# Patient Record
Sex: FEMALE | Race: WHITE | Employment: FULL TIME | URBAN - METROPOLITAN AREA
[De-identification: names, ages, dates, MRNs, and addresses within clinical notes are randomized per-mention and may not be internally consistent; named-entity substitution may affect disease eponyms.]

---

## 2017-08-16 NOTE — TELEPHONE ENCOUNTER
Macie Siegel to notify patient that her daisy on 09/05/2017 has been cancelled. Advised patient to call back to reschedule for 09/07/2017. Letter sent in mail.

## 2017-08-29 NOTE — TELEPHONE ENCOUNTER
Requesting Citalopram  LOV: 8/9/16  RTC: not noted  Last Labs: n/a  Filled: 8/9/16 #90 with 3 refills    Future Appointments  Date Time Provider Yariel Metz   9/19/2017 9:45 AM Sweta Tan MD [facility] [address] [facility] [address]    Patient is scheduled for office vis

## 2017-09-19 NOTE — PROGRESS NOTES
REASON FOR VISIT:    Jesse Hollingsworth is a 43year old female who presents for an [address].     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     Stress: manageable stress     Trying to do exercise    Seeing a therapist       If you are a male age 38-65 or a female for: A1C  Glucose (mg/dL)   Date Value   02/02/2015 81     GLUCOSE (mg/dL)   Date Value   01/30/2014 80         Gonorrhea Screening if high risk No results found for: GONOCOCCUS    HIV Screening For all adults age 22-65, older adults at increased risk HIV Prescriptions:  CITALOPRAM HYDROBROMIDE 20 MG Oral Tab TAKE 1 TABLET (20 MG) BY ORAL ROUTE ONCE DAILY Disp: 30 tablet Rfl: 0      MEDICAL INFORMATION:   Past Medical History:   Diagnosis Date   • Anxiety    • Celiac disease    • Chest pain, unspecified 1/2 history  ALL/ASTHMA: denies hx of allergy or asthma    EXAM:   BP 90/60   Pulse 60   Resp 16   Ht 63\"   Wt 138 lb   LMP 09/15/2017   BMI 24.45 kg/m²    Patient's last menstrual period was 09/15/2017.    GENERAL: well developed, well nourished, in no appare perfomed  Exercise counseling perfomed    SUGGESTED VACCINATIONS - Influenza, Pneumococcal, Zoster, Tetanus     Immunization History   Administered Date(s) Administered   • TD 03/20/2008       Influenza Annually   Pneumococcal if high risk   Td/Tdap once t

## 2017-10-03 NOTE — TELEPHONE ENCOUNTER
Requesting Citalopram   LOV: 9/19/17  RTC: 6 months   Last Labs: n./a   Filled: 8/30/17 #30 with 0 refills    No future appointments. Pended if okay.     Time started: 1448    Time ended: 6416    Total time spent on chart: 1 min

## 2017-10-03 NOTE — TELEPHONE ENCOUNTER
Pt calling to check on status of refill, sts it was to be authorized at her last visit on 9/19/17. Pt sts she is out of meds and needs for tonight. Pt wants to talk with nurse.

## 2017-11-23 NOTE — ED PROVIDER NOTES
Patient Seen in: THE [facility] Emergency Department In [place]    History   Patient presents with:  Fever (infectious)  Abdomen/Flank Pain (GI/)    Stated Complaint: fever/abd pain    HPI    This is a 61-year-old female who arrives here with complaints of Pulse 96   Temp 98.8 °F (37.1 °C) (Temporal)   Resp 14   Ht 160 cm (5' 3\")   Wt 63.5 kg   LMP 11/11/2017   SpO2 98%   BMI 24.80 kg/m²         Physical Exam  General: . Female no respiratory distress  The patient is in no respiratory distress.  The patient Abnormality         Status                     ---------                               -----------         ------                     CBC W/ DIFFERENTIAL[[id number]]          Abnormal            Final result                 Please view results for these Approved by: Greyson Reilly MD          The patient's urinalysis, lipase is comprehensive, pregnancy was negative.   The patient was reexamined she has some minimal tenderness left lower quadrant but no other right lower quadrant tenderness no rebound, guar

## 2018-10-04 PROBLEM — F41.9 ANXIETY: Status: ACTIVE | Noted: 2018-10-04

## 2018-10-04 NOTE — PATIENT INSTRUCTIONS
Your weight:  Wt Readings from Last 6 Encounters:  10/04/18 : 149 lb  10/02/18 : 147 lb 8 oz  11/23/17 : 140 lb  09/19/17 : 138 lb  05/09/17 : 130 lb 6.4 oz  08/09/16 : 135 lb 9.6 oz    Your BMI  Body mass index is 26.39 kg/m².     What does it mean to be o need to follow a diet that fits with their lifestyle, you have to eat healthy foods. No more fast foods and restaurants and takeout. No more processed foods from a box or frozen foods.   You need to eat mostly plants and vegetables, those should be the bu - baguettes   - croissants   - potato chips   - cookies   - white crackers   - brownies   - cakes   - candy   - sugar   - brown sugar   - honey     GOOD carbs  below  - spinach   - kale   - tomatoes   - mushrooms   - beets   - brussels sprouts   - brocco

## 2018-10-04 NOTE — PROGRESS NOTES
Patient presents with:  Physical: Routine physical no pap. pt had pap 10/2/18. Fasting labs due. Mammogram ordered by OB. Imm/Inj: flu shot  Medication Request: citalopram  Establish Care: re-establishing care with new provider      HPI:  1.  Hx of anxiet disorder     Celiac disease     Anxiety      Past Medical History:   Diagnosis Date   • Anxiety    • Celiac disease    • Chest pain, unspecified 1/2009   • Decreased libido    • Migraines      Past Surgical History:  3/24/2015: ESOPHAGOGASTRODUODENOSCOPY, RASH    Comment:CAPS  Penicillins             RASH  Sulfa Drugs Cross R*    RASH      Physical Exam  /72   Pulse 74   Temp 98.2 °F (36.8 °C) (Temporal)   Resp 16   Ht 63\"   Wt 149 lb   LMP 09/24/2018 (Exact Date)   SpO2 99%   BMI 26.39 kg/ for women ages 27 to 72 years who want to lengthen the screening interval, screening with a combination of cytology and human papillomavirus (HPV) testing every 5 years. Breast Cancer Screening: Yearly starting at the age of 36.   If positive family hx ( criticizing your drinking? 0   Have you ever felt bad or Guilty about your drinking? 0   Have you ever had a drink first thing in the morning to steady your nerves or to get rid of a hangover (Eye opener)?  0   Total Score: Item responses on the CAGE are sc

## 2018-10-18 NOTE — TELEPHONE ENCOUNTER
Pt is calling because she has been experiencing the following:      - sore throat     - when swallowing she feels like there is a lump when nothing is there     - R side of her neck it sore to the touch    Pt is not sure if it could just be a virus going a

## 2018-10-18 NOTE — TELEPHONE ENCOUNTER
Patient has had symptoms since last weekend (Sat) Sore throat, right side of neck is sore to the touch, feels like a lump in her throat when she swallows,  no fever. Advised that I cannot say that it is a cold or not.   Generally if symptoms persist for a

## 2018-10-18 NOTE — PATIENT INSTRUCTIONS
Viral Upper Respiratory Illness (Adult)  You have a viral upper respiratory illness (URI), which is another term for the common cold. This illness is contagious during the first few days. It is spread through the air by coughing and sneezing.  It may also Call your healthcare provider right away if any of these occur:  · Cough with lots of colored sputum (mucus)  · Severe headache; face, neck, or ear pain  · Difficulty swallowing due to throat pain  · Fever of 100.4°F (38°C) or higher, or as directed by you · You may use acetaminophen or ibuprofen to control pain and fever, unless another medicine was prescribed. If you have chronic liver or kidney disease, have ever had a stomach ulcer or gastrointestinal bleeding, or are taking blood-thinning medicines, joseph

## 2018-10-18 NOTE — PROGRESS NOTES
Patient presents with:  Swollen Glands: R side, started out with sore throat but is now gone. Pt states she feels a lump when she swallows.       HPI:     Lazara Oliver is a 34year old female who presents for evaluation of a chief complaint of sore th push fluids, plenty of rest, nasal saline drops, keep humidifier on and tylenol/motrin prn fevers  -rtc as needed, sooner if s/s worsen  All results reviewed and discussed with patient. See AVS for detailed discharge instructions for your condition today.

## 2019-06-18 NOTE — PROGRESS NOTES
HPI:    Patient ID: Brigitte Lo is a 34year old female.   Patient presents with:  Ear Problem: She went to the immediate care last week - her daughter had an infection and when she went in to [address] [place] [address] she was told there was fluid but not infected, she was scalp prn itch and flaking Disp:  Rfl:    Multiple Vitamins-Minerals (MULTIVITAMIN OR) Take by mouth. Disp:  Rfl:    Probiotic Product (PROBIOTIC OR) Take by mouth.  Disp:  Rfl:    Levonorgestrel-Ethinyl Estrad 0.15-30 MG-MCG Oral Tab Take 1 tablet by mouth Refill: 0  - Montelukast Sodium (SINGULAIR) 10 MG Oral Tab; Take 1 tablet (10 mg total) by mouth daily. Dispense: 90 tablet;  Refill: 3  F/u if symptoms persist. Patient understands that ear effusions are typically self-resolving but can take several weeks

## 2019-09-24 NOTE — TELEPHONE ENCOUNTER
Requesting: Citalopram 20mg  LOV: 6/18/29 for acute  RTC: -  Last Relevant Labs: 11/23/17  Filled: 10/4/18 #90 with 3 refills    No future appointments. -Medication pended for review.

## 2020-02-10 PROBLEM — Z98.890 H/O LEEP: Status: ACTIVE | Noted: 2020-02-10

## 2020-02-17 NOTE — PATIENT INSTRUCTIONS
Your weight:  Wt Readings from Last 6 Encounters:  02/17/20 : 154 lb (69.9 kg)  11/14/19 : 155 lb 6.4 oz (70.5 kg)  06/18/19 : 151 lb (68.5 kg)  01/22/19 : 150 lb (68 kg)  10/18/18 : 149 lb (67.6 kg)  10/04/18 : 149 lb (67.6 kg)    Your BMI  Body mass inde less than 6063-0756 calories a day. Patients need to follow a diet that fits with their lifestyle, you have to eat healthy foods. No more fast foods and restaurants and takeout. No more processed foods from a box or frozen foods.   You need to eat mostly rice   - sugar-sweetened cereals   - bagels   - baguettes   - croissants   - potato chips   - cookies   - white crackers   - brownies   - cakes   - candy   - sugar   - brown sugar   - honey     GOOD carbs  below  - spinach   - kale   - tomatoes   - mushroo

## 2020-02-17 NOTE — PROGRESS NOTES
Patient presents with:  Weight Problem: Weight loss management  Complete Form: biometric screening form      HPI:  Patient presents with:  Weight Problem: Weight loss management  Complete Form: biometric screening form        PMHx:   Hx of abnormal pap sme vomiting, abdominal pain, diarrhea, blood in stool and abdominal distention. Genitourinary: Negative for dysuria, hematuria and difficulty urinating. Musculoskeletal: Negative for myalgias, back pain, joint swelling, arthralgias and gait problem.    Ski use: Yes      Alcohol/week: 0.0 standard drinks      Types: 2 - 4 Glasses of wine per week      Comment: socially    Drug use: No      Current Outpatient Medications   Medication Sig Dispense Refill   • Levonorgestrel-Ethinyl Estrad 0.15-30 MG-MCG Oral Tab Non tender, no masses, no organomegaly or hernias. Musculoskeletal: Normal range of motion. No edema and no tenderness. No effusions. Lymphadenopathy: No cervical adenopathy. Neurological: Normal reflexes. No cranial nerve deficit or sensory deficit. Imaging & Consults:  TETANUS, DIPHTHERIA TOXOIDS AND ACELLULAR PERTUSIS VACCINE (TDAP), >7 YEARS, IM USE  OP REFERRAL TO NUTRITIONIST/DIETICIAN      Return in about 4 weeks (around 3/16/2020), or if symptoms worsen or fail to improve, for weight chec not eating potato, starches, anything with flour, or added sugar. No cakes, cookies, pasta, bread, rice, bagels, crackers, chips.   Fill your plate with fruits, vegetables, nuts, whole grains, and meat of every kind except processed meat like salami, bansal Orlistat, Phentermine/Topiramate ER, and Lorcaserin/Belviq, Saxenda, and Contrave (wellbutrin/naltrexone), read about them    8.  Avoid bad carbs but focus on good carbs  BAD carbs have added sugar such as  - soft drinks   - sport drinks   - fruit drinks

## 2020-04-28 NOTE — TELEPHONE ENCOUNTER
Indication for ASA use is the age of 54 for women. Based on her medical history I anjelica preciadoot advise taking preventative ASA for CVA at the age of 37.      The risk of CVA with covid needs to be further studied and no guidelines have advised on taking asp

## 2020-04-28 NOTE — TELEPHONE ENCOUNTER
See attached e-mail.    DX on problem List: Celiac disease/gen anxiety disorder/HX LEEP    2/17/20 OV Dr. Tosha Dawn Physical/overweight

## 2020-04-28 NOTE — TELEPHONE ENCOUNTER
----- Message from Daniela Phan sent at 4/26/2020 12:06 PM CDT -----  Regarding: Non-Urgent Medical Question  Contact: [phone]  Hi,    I've been seeing that people in their 30s-40s are having strokes connected with COVID-19.   Would it be ok if

## 2020-06-26 NOTE — TELEPHONE ENCOUNTER
Requesting : CITALOPRAM HYDROBROMIDE 20 MG   LOV: 2/17/20  RTC: 4 weeks  Last Relevant Labs:   Filled: 9/25/19  #90 with 2 refills    No future appointments.

## 2020-07-14 NOTE — TELEPHONE ENCOUNTER
Pt states she has pain in her chest after eating or drinking anything, even water, over the last couple of days. Pt feels it is probably GERD or reflux but she has not experienced that since she was pregnant and her youngest is 6years old.   Pt states she

## 2020-07-15 NOTE — PATIENT INSTRUCTIONS
GERD (Adult)    The esophagus is a tube that carries food from the mouth to the stomach. A valve (the LES, lower esophageal sphincter) at the lower end of the esophagus prevents stomach acid from flowing upward.  When this valve doesn't work properly, sto · If your symptoms occur during sleep, use a foam wedge to elevate your upper body (not just your head.) Or, place 4\" blocks under the head of your bed. Or use 2 bed risers under your bedframe.   Medicines  If needed, medicines can help relieve the symptom © 8065-8228 [employer] [address]. [address] [facility], [facility] [place]. All rights reserved. This information is not intended as a substitute for professional medical care. Always follow your healthcare professional's instructions.

## 2020-07-15 NOTE — PROGRESS NOTES
Patient presents with:  Chest Pain: when eating or drinking, feels like flare-up, discomfort, pressure, at times difficulty swallowing, took an antiacid and felt better.       HPI:  Aletha Murphy is a 37year old female who presents for complaints of h • Anxiety    • Celiac disease    • Chest pain, unspecified 1/2009   • CONNIE II (cervical intraepithelial neoplasia II) 12/03/2019    COLPOSCOPY   • Decreased libido    • HPV (human papilloma virus) infection 11/2019   • LGSIL on Pap smear of cervix 11/2019 Penicillins             RASH, HIVES    Comment:CAPS  Amoxicillin             RASH    Comment:CAPS  Sulfa Drugs Cross R*    RASH    Health Maintenance  Immunizations:    Immunization History  Administered            Date(s) Administered    FLULAVAL 6 months - Omeprazole 40 MG Oral Capsule Delayed Release; Take 1 capsule (40 mg total) by mouth daily as needed. Dispense: 30 capsule; Refill: 0    3. Anxiety  ISAIAS 7:7  Anxiety - at goal, no suicidal or homicidal thoughts. Continue present management.  Patient will Symptoms of reflux include burning, pressure or sharp pain in the upper abdomen or mid to lower chest. The pain can spread to the neck, back, or shoulder.  There may be belching, an acid taste in the back of the throat, chronic cough, or sore throat, or ling · Acid inhibitors (proton pump inhibitors PPIs) to decrease acid production in a different way than the blockers. They may work better, but can take a little longer to take effect.   Take an antacid 30 to 60 minutes after eating and at bedtime, but not at t

## 2020-09-30 NOTE — TELEPHONE ENCOUNTER
Citalopram Hydrobromide 20 MG Oral Tab    [id number] - [address] [facility] - [address] [place] [address], [phone]

## 2020-10-01 NOTE — TELEPHONE ENCOUNTER
Citalopram Hydrobromide 20 MG Oral Tab         Sig: Take 1 tablet (20 mg total) by mouth daily.     Disp:  90 tablet    Refills:  0    Start: 10/1/2020    Class: Normal    For: Anxiety    To pharmacy: Needs appt for further refill    Last ordered: 2 months

## 2021-09-23 NOTE — TELEPHONE ENCOUNTER
See Refill Request encounter 9/23/21. Pt scheduled appointment for annual physical. She is out of her Citalopram and would like a refill before her appointment.

## 2021-09-23 NOTE — TELEPHONE ENCOUNTER
Future Appointments   Date Time Provider Yariel Metz   10/16/2021 10:30 AM Sade Morrison DO [facility] [address] [facility] [address]

## 2021-09-23 NOTE — TELEPHONE ENCOUNTER
Future Appointments   Date Time Provider Yariel Lashay   10/16/2021 10:30 AM Anuradha Alfonso, DO [facility] [address] [facility] [address]     Patient scheduled her annual, took her last Citalopram today. Also would like lab orders entered, please advise.

## 2021-09-23 NOTE — TELEPHONE ENCOUNTER
Citalopram Hydrobromide 20 Mg Tab Carmen          Will file in chart as: CITALOPRAM 20 MG Oral Tab    Sig: TAKE ONE TABLET BY MOUTH ONE TIME DAILY    Disp:  90 tablet    Refills:  0    Start: 9/23/2021    Class: Normal    Non-formulary For: Anxiety    Last o

## 2021-10-05 NOTE — TELEPHONE ENCOUNTER
- Pt is calling to request lab order to be done prior to physical.    Future Appointments   Date Time Provider Yariel Metz   10/16/2021 10:30 AM Selena Alfonso, DO [facility] [address] [facility] [address]     Please call patient when order is placed.   [facility] [address]

## 2021-10-19 NOTE — PROGRESS NOTES
Patient presents with:  Physical: Routine annual physical with labs completed and would like to discuss  Immunization/Injection: Flu shot- consent signed      HPI:  Patient presents with:  Physical: Routine annual physical with labs completed and would lik (70.5 kg)  06/18/19 : 151 lb (68.5 kg)          Review of Systems   Constitutional: Negative for fever, chills and fatigue. No distress. HENT: Negative for hearing loss, congestion, sore throat, neck pain and dental problem.     Eyes: Negative for pain and Other (Hypercholesterolemia) Father    • Other (gerd) Father    • Thyroid Disorder Mother    • Other (gallbladder disease) Mother    • Other (Osteoporosis) Mother    • Stroke Maternal Grandmother    • Colon Cancer Maternal Grandmother    • Other Negar [place] 3\" (1.6 m)   Wt 146 lb (66.2 kg)   LMP 11/01/2020   SpO2 99%   BMI 25.86 kg/m²   Constitutional: Oriented to person, place, and time. No distress. HEENT:  Normocephalic and atraumatic.  Hearing and tympanic membranes normal.  Nose normal. Oropharynx is c Dispense: 90 tablet; Refill: 2     Celiac disease  Stable, on gluten free diet. H/O LEEP  Repeat pap next month with gyn. High serum thyroid stimulating hormone (TSH)  Repeat thyroid labs next month.        Orders Placed This Encounter      Flula your blood pressure is normal  Normal blood pressure is less than 120/80 mm Hg  If your blood pressure reading is higher than normal, follow the advice of your healthcare provider   Breast cancer All women at average risk in this age group Screening with a All women At routine exams. Those with risk factors for HIV should be tested at least annually.    Obesity All women in this age group At routine exams   Syphilis Women at increased risk for infection–talk with your healthcare provider At routine exams   Tu breast and ovarian cancer susceptibility Women with increased risk for having gene mutation When your risk is known   Breast cancer and chemoprevention Women at high risk for breast cancer When your risk is known   Diet and exercise Women who are overweigh

## 2021-10-20 PROBLEM — R79.89 HIGH SERUM THYROID STIMULATING HORMONE (TSH): Status: ACTIVE | Noted: 2021-10-20

## 2021-10-21 NOTE — PATIENT INSTRUCTIONS
Prevention Guidelines, Women Ages 36 to 52  Screening tests and vaccines are an important part of managing your health. A screening test is done to find diseases in people who don't have any symptoms.  The goal is to find a disease early so lifestyle marti sigmoidoscopy every 5 years, or  · Colonoscopy every 10 years, or  · CT colonography (virtual colonoscopy) every 5 years, or  · Yearly fecal occult blood test, or  · Yearly fecal immunochemical test every year, or  · Stool DNA test, every 3 years  If you c least 4 weeks after the first dose   Hepatitis A Women at increased risk for infection–talk with your healthcare provider 2 doses given 6 months apart   Hepatitis B Women at increased risk for infection–talk with your healthcare provider 3 doses over 6 mon American Academy of Ophthalmology  David last reviewed this educational content on 11/1/2017  © 5655-4885 [employer] [address]. All rights reserved. This information is not intended as a substitute for professional medical care.  Always follow your

## 2022-01-18 NOTE — TELEPHONE ENCOUNTER
Patient calling, patient fell over a week ago still experiencing some soreness around her knee/calf area. Patient asking if poss order for x-ray recommended or if this is just normal feeling due to injury.  Please advise

## 2022-01-18 NOTE — TELEPHONE ENCOUNTER
LM per HIPAA form advising pt to call the office and schedule an appointment with one of Dr. Rudy Rust partners to evaluate her knee and determine what if any further testing/treatment is needed.  Advised it is not possible to say over the phone without se

## 2022-01-19 NOTE — TELEPHONE ENCOUNTER
Pt called the referral dept to get a referral to a [address] therapist/counseling or social work. Pt would prefer not to go to [employer] used to work there.  Would be ok to see a  who is located in physician offices-just not at main b

## 2022-01-19 NOTE — TELEPHONE ENCOUNTER
Spoke to pt, informed behavioral health is self referral for her insurance. Behavioral health access line # given to pt. Pt verbalized understanding and agreement. All questions answered.

## 2022-05-09 NOTE — TELEPHONE ENCOUNTER
See Patient Message 5/6/22. Pt informed of results by radiology.     Pt scheduled 5/20/22 for additional views

## 2022-11-15 NOTE — TELEPHONE ENCOUNTER
Pt would like a referral to dermatologist Dr. Ra Hammond. Please place referral and call pt with information.

## 2022-11-15 NOTE — TELEPHONE ENCOUNTER
Future Appointments   Date Time Provider Yariel Metz   12/15/2022 11:30 AM Nandini Greer, DO [facility] [address] [facility] [address]     Pt would like to know if referral could be placed prior to her appt-she has a mole that she would like looked at by derm.

## 2023-03-28 NOTE — TELEPHONE ENCOUNTER
Disp Refills Start End    ALPRAZolam (XANAX) 0.5 MG Oral Tab 30 tablet 0 12/15/2022 1/14/2023    Sig - Route:  Take 1 tablet (0.5 mg total) by mouth nightly as needed for Sleep or Anxiety. - Oral    Sent to pharmacy as: ALPRAZolam 0.5 MG Oral Tablet (Xanax)    E-Prescribing Status: Receipt confirmed by pharmacy (12/15/2022 11:53 AM CST)    ALPRAZolam     Dispensed Written Strength Quantity Refills Days Supply Provider Pharmacy   ALPRAZOLAM 0.5 MG TAB ACTA 12/15/2022 12/15/2022  30 each  [id number] [place]       Video 1/13/23  Last OV 12/15/22 for physical

## 2023-09-11 NOTE — TELEPHONE ENCOUNTER
Requested Renewals     Name from pharmacy: Citalopram Hydrobromide 20 Mg Tab Carmen         Will file in chart as: CITALOPRAM 20 MG Oral Tab    Sig: TAKE ONE TABLET BY MOUTH ONE TIME DAILY    Disp: 90 tablet    Refills: 0    Start: 9/9/2023    Class: Normal    Non-formulary For: Anxiety          No future appointments. LOV: 12/15/22 for physical exam  Last refill given on 6/13/23 for #90    -medication pended for review.

## 2023-09-25 NOTE — TELEPHONE ENCOUNTER
Pap abstracted 2/22/23     EPITHELIAL CELL ABNORMALITY. LOW GRADE SQUAMOUS INTRAEPITHELIAL LESION (LSIL).

## 2023-10-12 NOTE — TELEPHONE ENCOUNTER
Requesting Alprazolam 0.5mg  LOV: 9/26/23  RTC: not noted  Last Relevant Labs: 10/8/21  Filled: 9/4/23 #30 with 0 refills    No future appointments.     Per [place] , last dispensed 9/5/23 #30    Rx pended and routed for approval/denial

## 2023-11-20 NOTE — TELEPHONE ENCOUNTER
Requesting Alprazolam 0.5mg  LOV: 9/26/23  RTC: prn  Last Relevant Labs: 10/8/21  Filled: 10/13/23 #30 with 0 refills    Future Appointments   Date Time Provider Yariel Metz   3/11/2024 12:30 PM Dulce Odonnell DO [address] [facility]     Per [facility] , last dispensed 10/13/23 #30    Rx pended and routed for approval/denial

## 2023-11-28 NOTE — TELEPHONE ENCOUNTER
Spoke with Nathalie Lee at [facility], was advised that Alprazolam Rx is ready for pt to .

## 2023-11-28 NOTE — TELEPHONE ENCOUNTER
Rx sent 11/21/23  ALPRAZolam 0.5 MG Oral Tab 30 tablet 0 11/21/2023     Sig - Route:  Take 1 tablet (0.5 mg total) by mouth nightly as needed for Anxiety or Sleep. - Oral    Sent to pharmacy as: ALPRAZolam 0.5 MG Oral Tablet (Xanax)    E-Prescribing Status: Receipt confirmed by pharmacy (11/21/2023  9:49 AM CST)

## 2023-12-19 NOTE — TELEPHONE ENCOUNTER
Labs abstracted from [facility] in [place]  Per MD  High HDL- recommend low fat diet and exercise

## 2024-01-02 NOTE — TELEPHONE ENCOUNTER
Requesting Alprazolam 0.5mg  Last OV: 9/26/23  RTC: not noted  Last Rx'd 11/21/23 #30 with 0 refills    Future Appointments   Date Time Provider Department Center   3/11/2024 12:30 PM Swetha Mcdonnell, DO [facility] SUB GI     Per [place] , last dispensed 11/21/23 #30    Non-protocol med:  Rx pended and routed for approval/denial

## 2024-02-12 NOTE — TELEPHONE ENCOUNTER
Requesting Alprazolam 0.5mg  Last OV: 9/26/23  RTC: not noted  Last Rx'd 1/2/24 #30 with 0 refills    Future Appointments   Date Time Provider Department Center   3/11/2024 12:30 PM Swetha Mcdonnell, DO [facility] SUB GI     Per [place] , last dispensed 1/2/24 #30    Rx pended and routed for approval/denial

## 2024-03-11 PROBLEM — Z12.11 SPECIAL SCREENING FOR MALIGNANT NEOPLASMS, COLON: Status: ACTIVE | Noted: 2024-03-11

## 2024-03-14 NOTE — TELEPHONE ENCOUNTER
Requesting Alprazolam 0.5mg  Last OV: 9/26/23  RTC: not noted  Last Rx'd 2/13/24 #30 with 0 refills    Future Appointments   Date Time Provider Department Center   5/15/2024 10:00 AM Swetha Mcdonnell DO [facility]     Per [place] , last dispensed 2/13/24 #30    Non-protocol med:  Rx pended and routed for approval/denial

## 2024-03-21 NOTE — TELEPHONE ENCOUNTER
Duplicate request - Rx sent 3/17/24    ALPRAZolam 0.5 MG Oral Tab 30 tablet 0 3/17/2024 --    Sig - Route: Take 1 tablet (0.5 mg total) by mouth nightly as needed for Anxiety or Sleep. - Oral    Sent to pharmacy as: ALPRAZolam 0.5 MG Oral Tablet (Xanax)    E-Prescribing Status: Receipt confirmed by pharmacy (3/17/2024 11:15 PM CDT)        Pharmacy    [id number] - [place], [place] - [address] [phone], [phone]

## 2024-04-17 NOTE — TELEPHONE ENCOUNTER
Requesting Citalopram 20mg  LOV: 9/26/23  RTC: 6 months  Last Relevant Labs: 11/9/23  Filled: 3/11/24 #30 with 0 refills    Future Appointments   Date Time Provider Department Center   5/15/2024 10:00 AM Swetha Mcdonnell DO [facility]     Psychiatric Non-Scheduled (Anti-Anxiety) [id number]/15/2024 08:13 AM   Protocol Details In person appointment or virtual visit in the past 6 mos or appointment in next 3 mos    Depression Screening completed within the past 12 months     Pt due for 6 month follow up and annual physical, please schedule.

## 2024-04-18 NOTE — TELEPHONE ENCOUNTER
From: Marce CROOK  To: Lizzeth Warren  Sent: 4/18/2024 9:05 AM CDT  Subject: Refill Request/Appointment Needed    Good morningLizzeth.  We received a refill request from your pharmacy for your medication. Dr. Alfonso has asked that you schedule an appointment for your annual wellness exam.Please either call the office at [phone] to schedule, or you can schedule via Ol[facility].  Thank you,  Marce, [email or web address]  Patient Services Representative Lead

## 2024-04-18 NOTE — TELEPHONE ENCOUNTER
Psychiatric Non-Scheduled (Anti-Anxiety) Passed04/18/2024 09:52 AM   Protocol Details In person appointment or virtual visit in the past 6 mos or appointment in next 3 mos    Depression Screening completed within the past 12 months     Rx sent per protocol

## 2024-04-18 NOTE — TELEPHONE ENCOUNTER
Future Appointments   Date Time Provider Department Center   5/13/2024  3:30 PM Heladio Alfonso DO [facility] [address]   5/15/2024 10:00 AM Swetha Mcdonnell DO [facility]

## 2024-04-25 NOTE — TELEPHONE ENCOUNTER
Requesting Alprazolam 0.5mg  Last OV: 9/26/23  RTC: not noted  Last Rx'd 3/17/24 #30 with 0 refills    Future Appointments   Date Time Provider Department Center   5/13/2024  3:30 PM Heladio Alfonso DO [facility] [address]   5/15/2024 10:00 AM Swetha Mcdonnell DO [facility]     Per [place] , last dispensed 3/1/24 #30    Controlled med:  Rx pended and routed for approval/denial

## 2024-05-13 ENCOUNTER — OFFICE VISIT (OUTPATIENT)
Dept: FAMILY MEDICINE CLINIC | Facility: CLINIC | Age: 44
End: 2024-05-13
Payer: COMMERCIAL

## 2024-05-13 VITALS
TEMPERATURE: 98 F | WEIGHT: 134 LBS | RESPIRATION RATE: 16 BRPM | SYSTOLIC BLOOD PRESSURE: 98 MMHG | OXYGEN SATURATION: 98 % | HEIGHT: 63 IN | BODY MASS INDEX: 23.74 KG/M2 | DIASTOLIC BLOOD PRESSURE: 62 MMHG | HEART RATE: 67 BPM

## 2024-05-13 DIAGNOSIS — F41.9 ANXIETY: ICD-10-CM

## 2024-05-13 DIAGNOSIS — Z12.31 SCREENING MAMMOGRAM FOR BREAST CANCER: ICD-10-CM

## 2024-05-13 DIAGNOSIS — Z98.890 H/O LEEP: ICD-10-CM

## 2024-05-13 DIAGNOSIS — Z00.00 WELL ADULT EXAM: Primary | ICD-10-CM

## 2024-05-13 DIAGNOSIS — Z12.4 PAP SMEAR FOR CERVICAL CANCER SCREENING: ICD-10-CM

## 2024-05-13 PROCEDURE — 99396 PREV VISIT EST AGE 40-64: CPT | Performed by: FAMILY MEDICINE

## 2024-05-13 PROCEDURE — 3074F SYST BP LT 130 MM HG: CPT | Performed by: FAMILY MEDICINE

## 2024-05-13 PROCEDURE — 3008F BODY MASS INDEX DOCD: CPT | Performed by: FAMILY MEDICINE

## 2024-05-13 PROCEDURE — 3078F DIAST BP <80 MM HG: CPT | Performed by: FAMILY MEDICINE

## 2024-05-13 RX ORDER — ALPRAZOLAM 0.5 MG/1
0.5 TABLET ORAL NIGHTLY PRN
Qty: 90 TABLET | Refills: 1 | Status: SHIPPED | OUTPATIENT
Start: 2024-05-13

## 2024-05-13 RX ORDER — CITALOPRAM 20 MG/1
20 TABLET ORAL DAILY
Qty: 90 TABLET | Refills: 3 | Status: SHIPPED | OUTPATIENT
Start: 2024-05-13

## 2024-05-13 NOTE — PROGRESS NOTES
Note to patient: The 21st Century Cures Act makes medical notes like these available to patients in the interest of transparency. However, be advised this is a medical document. It is intended as peer to peer communication. It is written in medical language and may contain abbreviations or verbiage that are unfamiliar. It may appear blunt or direct. Medical documents are intended to carry relevant information, facts as evident, and the clinical opinion of the practitioner.         Chief Complaint   Patient presents with    Well Adult       HPI:      42 yo female with PMH of Celiac disease, psoriasis, anxiety presents for annual physical exam    Psoriasis:    She uses clobetasol for psoriasis on her scalp. Saw derm for this.     Celiac disease:     Hx celiac disease, she follows gluten free diet. She does not see GI. Celiac disease is well controlled.         Anxiety:    For anxiety she is on citalopram 20mg daily and is tolerating the medication well. She denies any SE.     Her daughter is 12 year's old, has been dx with anxiety and started on medication.    She was prescribed xanax prn for situational anxiety with her son, but has been using it almost nightly to help her fall asleep. She reports she can stay asleep once she's fallen asleep. She's been doing this for the last six months.     She states her menses are changing and thinks it could be contributing to issues with falling asleep.Her periods have become lighter, menses last about 5 days and her cycle is now every 26 days. She is no longer on an OCP.    Weight loss:    Reports a 20 pound weight loss with regular exercise and a calorie deficit, she's eating about 1260 calories per day.    Wt Readings from Last 6 Encounters:   05/13/24 134 lb   02/14/24 144 lb 12.8 oz   09/26/23 153 lb   12/15/22 154 lb 8 oz   12/29/21 150 lb 9.6 oz   10/19/21 146 lb        She has HMO insurance now and needs a new GYN referral.     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   Mammogram: Due in August 2024.   No  breast cancer in family.   Colonoscopy: Done in March 2024, grandfather had colon cancer.   Pap smear: Due May 2024, followed by GYN, Hx of abnormal pap and positive HPV, she had  LEEP procedure in 2023 under anesthesia.   Utd on tdap.   Diet: healthy     Review of Systems   Constitutional: Negative for fever, chills and fatigue. No distress.  HENT: Negative for hearing loss, congestion, sore throat, neck pain   Eyes: Negative for pain and visual disturbance.   Respiratory: Negative for cough, chest tightness, shortness of breath and wheezing.    Cardiovascular: Negative for chest pain, palpitations and leg swelling.   Gastrointestinal: Negative for nausea, vomiting, abdominal pain, diarrhea, blood in stool and abdominal distention.       Patient Active Problem List   Diagnosis    Generalized anxiety disorder    Celiac disease (HCC)    Anxiety    H/O LEEP    High serum thyroid stimulating hormone (TSH)    Special screening for malignant neoplasms, colon       Past Medical History:    Anxiety    Bad breath    Celiac disease (HCC)    Chest pain, unspecified    CONNIE II (cervical intraepithelial neoplasia II)    COLPOSCOPY    Decorative tattoo    Decreased libido    Headache disorder    HPV (human papilloma virus) infection    LGSIL on Pap smear of cervix    Migraines    Stress     Past Surgical History:   Procedure Laterality Date    Colposcopy,bx cervix/endocerv curr  12/03/2019    CONNIE 2    Leep  01/27/2020    CONNIE 2    Leep  05/19/2023    Upper gi endo no barretts  03/2015    celiac disease confirmed    Upper gi endoscopy,biopsy N/A 03/24/2015    Procedure: ESOPHAGOGASTRODUODENOSCOPY, POSSIBLE BIOPSY, POSSIBLE POLYPECTOMY 07256;  Surgeon: Job Harris MD;  Location: [facility]    X-ray hysterosalpingogram       Family History   Problem Relation Age of Onset     High Blood Pressure Father     High Cholesterol Father     Other (Hypercholesterolemia) Father     Other (gerd) Father     Thyroid Disorder Mother     Other (gallbladder disease) Mother     Other (Osteoporosis) Mother     Stroke Maternal Grandmother     Colon Cancer Maternal Grandmother     Other (congestive heart failure) Paternal Grandmother     Diabetes Maternal Grandfather     Colon Cancer Paternal Grandfather      Social History     Socioeconomic History    Marital status:    Tobacco Use    Smoking status: Never    Smokeless tobacco: Never   Vaping Use    Vaping status: Never Used   Substance and Sexual Activity    Alcohol use: Yes     Alcohol/week: 0.0 standard drinks of alcohol     Types: 2 - 4 Glasses of wine per week     Comment: socially    Drug use: No    Sexual activity: Yes     Partners: Male     Birth control/protection: Pill, OCP   Other Topics Concern    Exercise No       Current Outpatient Medications   Medication Sig Dispense Refill    ALPRAZolam 0.5 MG Oral Tab Take 1 tablet (0.5 mg total) by mouth nightly as needed for Anxiety or Sleep. 30 tablet 0    CITALOPRAM 20 MG Oral Tab Take 1 tablet (20 mg total) by mouth daily. 30 tablet 0    clobetasol 0.05 % External Solution Apply qd to scalp prn itch and flaking 50 mL 0    Multiple Vitamins-Minerals (MULTIVITAMIN OR) Take by mouth.         Allergies  Allergies   Allergen Reactions    Penicillins RASH and HIVES     CAPS    Amoxicillin RASH     CAPS      Gluten Meal OTHER (SEE COMMENTS)     Gi issues    Sulfa Drugs Cross Reactors RASH       Health Maintenance  Immunizations:  Immunization History   Administered Date(s) Administered    Covid-19 Vaccine Moderna 100 mcg/0.5 ml 01/19/2021, 02/18/2021, 11/20/2021    FLULAVAL 6 months & older 0.5 ml Prefilled syringe (03603) 10/04/2018, 10/19/2021    FLUZONE 6 months and older PFS 0.5 ml (51996) 10/19/2021, 09/26/2023    Influenza 10/01/2019    TD 03/20/2008    TDAP 02/17/2020   Deferred Date(s)  Deferred    FLULAVAL 6 months & older 0.5 ml Prefilled syringe (62466) 09/19/2017         Physical Exam  LMP 08/27/2023   Constitutional: Oriented to person, place, and time. No distress.   HEENT:  Normocephalic and atraumatic. Hearing and tympanic membranes normal.  Nose normal. Oropharynx is clear and moist.   Eyes: Conjunctivae normal. No scleral icterus.   Neck:  No mass and no thyromegaly present.   Cardiovascular: Normal rate, regular rhythm and intact distal pulses.  No murmur, rubs or gallops.   Pulmonary/Chest: Effort normal and breath sounds normal. No respiratory distress. No wheezes, rhonchi or rales  Abdominal: Soft. Non tender, no masses, no organomegaly or hernias.      A/P:    Encounter Diagnoses   Name Primary?    Well woman exam with routine gynecological exam Yes    Well adult exam     Anxiety        No orders of the defined types were placed in this encounter.      Meds & Refills for this Visit:  Requested Prescriptions     Pending Prescriptions Disp Refills    citalopram 20 MG Oral Tab 90 tablet 3     Sig: Take 1 tablet (20 mg total) by mouth daily.    ALPRAZolam 0.5 MG Oral Tab 30 tablet 3     Sig: Take 1 tablet (0.5 mg total) by mouth nightly as needed for Anxiety or Sleep.       1. Well adult exam  Discussed diet, exercise, safe practices, health maintenance and screenings.     - CBC With Differential With Platelet  - Comp Metabolic Panel (14)  - Lipid Panel    2. Anxiety  She has a hx of anxiety and reports she's well controlled on Citalopram 20mg. Continue with medication.     - citalopram 20 MG Oral Tab; Take 1 tablet (20 mg total) by mouth daily.  Dispense: 90 tablet; Refill: 3  - ALPRAZolam 0.5 MG Oral Tab; Take 1 tablet (0.5 mg total) by mouth nightly as needed for Anxiety or Sleep.  Dispense: 90 tablet; Refill: 1    3. Pap smear for cervical cancer screening  4. H/O LEEP  Hx of HPV positive and LEEP conization procedures, she follows GYN and is due for a pap smear this month. She has a  new insurance and needs a new GYN. Referral provided.     - OBG Referral - In Network    5. Screening mammogram for breast cancer  Due for mammogram in August 2024, orders place.     - [facility] CHRISTIN 2D+3D SCREENING BILAT (CPT=77067/81898); Future      This pt was seen and examined by KIT Quiles.     All questions were answered and the patient understands the plan.       I have personally seen and examined the patient, I have reviewed the PA student's examination, notes, and agree with the assessment and plan.        Heladio Alfonso DO        This note was prepared using Dragon Medical voice recognition dictation software. As a result errors may occur. When identified these errors have been corrected. While every attempt is made to correct errors during dictation discrepancies may still exist.      Note to patient: The 21st Century Cures Act makes medical notes like these available to patients in the interest of transparency. However, be advised this is a medical document. It is intended as peer to peer communication. It is written in medical language and may contain abbreviations or verbiage that are unfamiliar. It may appear blunt or direct. Medical documents are intended to carry relevant information, facts as evident, and the clinical opinion of the practitioner.

## 2024-06-24 DIAGNOSIS — L40.9 PSORIASIS OF SCALP: ICD-10-CM

## 2024-06-26 RX ORDER — CLOBETASOL PROPIONATE 0.46 MG/ML
SOLUTION TOPICAL
Qty: 50 ML | Refills: 0 | OUTPATIENT
Start: 2024-06-26

## 2024-07-31 ENCOUNTER — OFFICE VISIT (OUTPATIENT)
Dept: OBGYN CLINIC | Facility: CLINIC | Age: 44
End: 2024-07-31
Payer: COMMERCIAL

## 2024-07-31 VITALS
BODY MASS INDEX: 23.39 KG/M2 | SYSTOLIC BLOOD PRESSURE: 124 MMHG | WEIGHT: 132 LBS | HEART RATE: 67 BPM | HEIGHT: 63 IN | DIASTOLIC BLOOD PRESSURE: 68 MMHG

## 2024-07-31 DIAGNOSIS — Z01.419 WELL WOMAN EXAM WITH ROUTINE GYNECOLOGICAL EXAM: ICD-10-CM

## 2024-07-31 DIAGNOSIS — Z12.4 CERVICAL CANCER SCREENING: Primary | ICD-10-CM

## 2024-07-31 PROCEDURE — 3008F BODY MASS INDEX DOCD: CPT | Performed by: STUDENT IN AN ORGANIZED HEALTH CARE EDUCATION/TRAINING PROGRAM

## 2024-07-31 PROCEDURE — 3078F DIAST BP <80 MM HG: CPT | Performed by: STUDENT IN AN ORGANIZED HEALTH CARE EDUCATION/TRAINING PROGRAM

## 2024-07-31 PROCEDURE — 87624 HPV HI-RISK TYP POOLED RSLT: CPT | Performed by: STUDENT IN AN ORGANIZED HEALTH CARE EDUCATION/TRAINING PROGRAM

## 2024-07-31 PROCEDURE — 99396 PREV VISIT EST AGE 40-64: CPT | Performed by: STUDENT IN AN ORGANIZED HEALTH CARE EDUCATION/TRAINING PROGRAM

## 2024-07-31 PROCEDURE — 88175 CYTOPATH C/V AUTO FLUID REDO: CPT | Performed by: STUDENT IN AN ORGANIZED HEALTH CARE EDUCATION/TRAINING PROGRAM

## 2024-07-31 PROCEDURE — 3074F SYST BP LT 130 MM HG: CPT | Performed by: STUDENT IN AN ORGANIZED HEALTH CARE EDUCATION/TRAINING PROGRAM

## 2024-07-31 NOTE — PROGRESS NOTES
Annual Exam (Ages 40-49)    Subjective:    This is a 43 year old  presenting for routine annual exam    Last pap: 23 LSIL, s/p leep 23    Cervix, LEEP:  -High grade squamous intraepithelial lesion (CONNIE-2).  -Cauterized edges of excision, negative for dysplasia     Also had a LEEP in     Menarche at age 9. Cycles occur monthly, last 8 days, light flow.      Review of Systems   Constitutional: Negative.    HENT: Negative.    Respiratory: Negative.    Gastrointestinal: Negative.    Endocrine: Negative.    Genitourinary: Negative.    Musculoskeletal: Negative.    Skin: Negative.    Allergic/Immunologic: Negative.    Neurological: Negative.      Objective:    Allergies   Allergen Reactions    Penicillins RASH and HIVES     CAPS    Amoxicillin RASH     CAPS      Gluten Meal OTHER (SEE COMMENTS)     Gi issues    Sulfa Drugs Cross Reactors RASH     Past Medical History:    Anxiety    Bad breath    Celiac disease (HCC)    Chest pain, unspecified    CONNIE II (cervical intraepithelial neoplasia II)    COLPOSCOPY    Decorative tattoo    Decreased libido    Headache disorder    HPV (human papilloma virus) infection    LGSIL on Pap smear of cervix    Migraines    Stress       Current Outpatient Medications:     citalopram 20 MG Oral Tab, Take 1 tablet (20 mg total) by mouth daily., Disp: 90 tablet, Rfl: 3    ALPRAZolam 0.5 MG Oral Tab, Take 1 tablet (0.5 mg total) by mouth nightly as needed for Anxiety or Sleep., Disp: 90 tablet, Rfl: 1    clobetasol 0.05 % External Solution, Apply qd to scalp prn itch and flaking, Disp: 50 mL, Rfl: 0    Multiple Vitamins-Minerals (MULTIVITAMIN OR), Take by mouth., Disp: , Rfl:   Past Surgical History:   Procedure Laterality Date    Colposcopy,bx cervix/endocerv curr  2019    CONNIE 2    Leep  2020    CONNIE 2    Leep  2023    Upper gi endo no barretts  2015    celiac disease confirmed    Upper gi endoscopy,biopsy N/A 2015    Procedure:  ESOPHAGOGASTRODUODENOSCOPY, POSSIBLE BIOPSY, POSSIBLE POLYPECTOMY 81987;  Surgeon: Job Harris MD;  Location: [facility]    X-ray hysterosalpingogram       Family History   Problem Relation Age of Onset    High Blood Pressure Father     High Cholesterol Father     Other (Hypercholesterolemia) Father     Other (gerd) Father     Thyroid Disorder Mother     Other (gallbladder disease) Mother     Other (Osteoporosis) Mother     Stroke Maternal Grandmother     Colon Cancer Maternal Grandmother     Other (congestive heart failure) Paternal Grandmother     Diabetes Maternal Grandfather     Colon Cancer Paternal Grandfather       reports that she has never smoked. She has never used smokeless tobacco. She reports current alcohol use. She reports that she does not use drugs.    Physical Exam     Vitals:    24 1411   BP: 124/68   Pulse: 67        Constitutional: She is oriented to person, place, and time. She appears well-developed and well-nourished.   Cardiovascular: normal peripheral perfusion  Pulmonary/Chest: non labored breathing  Breasts: Examined sitting and supine. No cervical, supraclavicular or axillary adenopathy, no masses, skin changes or nipple discharge.  Genitourinary: Normal appearing external genitalia. Vagina is well estrogenized. Normal appearing urethral meatus. Bartholin's gland normal to palpation. Very short and scarred cervix located to the patient's right. Uterus is 6 weeks size  and non tender. No cervical motion tenderness. Normal adnexa bilaterally without tenderness.  Neurological: She is alert and oriented to person, place, and time.     Assessment and Plan  This is a 43 year old  presenting for annual exam.    #Annual Exam  -Depression screening   Depression Screening (PHQ-2/PHQ-9): Over the LAST 2 WEEKS   Little interest or pleasure in doing things (over the last two weeks)?: Not at all    Feeling down, depressed, or hopeless (over the last two weeks)?: Not at  all    PHQ-2 SCORE: 0        -Blood pressure screening normal  -Body mass index is 23.38 kg/m².   -Immunizations:   HPV recommended  -Urinary incontinence screening mild  -Pap smear collected  -Mammogram already scheduled      Davy Duncan MD

## 2024-08-01 LAB — HPV E6+E7 MRNA CVX QL NAA+PROBE: NEGATIVE

## 2024-08-06 LAB
.: ABNORMAL
.: ABNORMAL

## 2024-09-04 ENCOUNTER — HOSPITAL ENCOUNTER (OUTPATIENT)
Dept: MAMMOGRAPHY | Age: 44
Discharge: HOME | End: 2024-09-04
Attending: FAMILY MEDICINE
Payer: COMMERCIAL

## 2024-09-04 DIAGNOSIS — Z12.31 SCREENING MAMMOGRAM FOR BREAST CANCER: ICD-10-CM

## 2024-09-04 PROCEDURE — 77063 BREAST TOMOSYNTHESIS BI: CPT | Performed by: FAMILY MEDICINE

## 2024-09-04 PROCEDURE — 77067 SCR MAMMO BI INCL CAD: CPT | Performed by: FAMILY MEDICINE

## 2024-09-17 ENCOUNTER — PATIENT MESSAGE (OUTPATIENT)
Dept: OBGYN CLINIC | Facility: CLINIC | Age: 44
End: 2024-09-17

## 2024-09-18 NOTE — TELEPHONE ENCOUNTER
From: Lizzeth Morgan  To: Davy Duncan  Sent: 9/17/2024 8:10 PM CDT  Subject: Long period     I wanted to reach out to ask if I should be concerned about a recent change in my cycle. My period came a week early and has been going on now for 10 days. This has never happened before. Is this something I need to be concerned about? Thank you

## 2024-10-22 ENCOUNTER — LAB REQUISITION (OUTPATIENT)
Dept: LAB | Facility: HOSPITAL | Age: 44
End: 2024-10-22
Payer: COMMERCIAL

## 2024-10-22 DIAGNOSIS — D48.5 NEOPLASM OF UNCERTAIN BEHAVIOR OF SKIN: ICD-10-CM

## 2024-10-22 PROCEDURE — 88305 TISSUE EXAM BY PATHOLOGIST: CPT

## 2024-11-26 DIAGNOSIS — F41.9 ANXIETY: ICD-10-CM

## 2024-11-27 NOTE — TELEPHONE ENCOUNTER
Requesting Alprazolam 0.5mg  Last OV: 5/13/24  RTC: 6 months  Last Rx'd 5/13/24 #90 with 1 refill    No future appointments.    Per [place] , last dispensed 9/1/24 #90    Controlled med:  Rx pended and routed for approval/denial

## 2024-11-28 RX ORDER — ALPRAZOLAM 0.5 MG
0.5 TABLET ORAL NIGHTLY PRN
Qty: 90 TABLET | Refills: 0 | Status: SHIPPED | OUTPATIENT
Start: 2024-11-28

## 2025-03-03 DIAGNOSIS — F41.9 ANXIETY: ICD-10-CM

## 2025-03-03 RX ORDER — ALPRAZOLAM 0.5 MG
0.5 TABLET ORAL NIGHTLY PRN
Qty: 90 TABLET | Refills: 0 | Status: SHIPPED | OUTPATIENT
Start: 2025-03-03

## 2025-03-03 NOTE — TELEPHONE ENCOUNTER
Requesting Alprazolam 0.5mg  Last OV: 5/13/24 Physical  RTC: 6 months  Last Rx'd 11/28/24 #90 with 0 refills    No future appointments.    Per [place] , last dispensed 11/29/24 #90    Controlled med:  Rx pended and routed for approval/denial

## 2025-03-09 ENCOUNTER — PATIENT MESSAGE (OUTPATIENT)
Dept: FAMILY MEDICINE CLINIC | Facility: CLINIC | Age: 45
End: 2025-03-09

## 2025-04-23 DIAGNOSIS — F41.9 ANXIETY: ICD-10-CM

## 2025-04-23 RX ORDER — CITALOPRAM HYDROBROMIDE 20 MG/1
20 TABLET ORAL DAILY
Qty: 90 TABLET | Refills: 0 | Status: SHIPPED | OUTPATIENT
Start: 2025-04-23

## 2025-04-23 NOTE — TELEPHONE ENCOUNTER
Requested Prescriptions     Pending Prescriptions Disp Refills    CITALOPRAM 20 MG Oral Tab [Pharmacy Med Name: Citalopram Hydrobromide 20 Mg Tab Carmen] 90 tablet 0     Sig: Take 1 tablet by mouth daily.       LOV: 11/13/4  RTC: 6m   Last Relevant Labs: not done  Filled: 5/13/24 #90 with 3 refills    Future Appointments   Date Time Provider Department Center   6/9/2025  1:30 PM Heladio Alfonso,  [facility] [address]

## 2025-05-29 ENCOUNTER — TELEPHONE (OUTPATIENT)
Dept: FAMILY MEDICINE CLINIC | Facility: CLINIC | Age: 45
End: 2025-05-29

## 2025-05-29 DIAGNOSIS — Z00.00 WELL ADULT EXAM: Primary | ICD-10-CM

## 2025-06-02 DIAGNOSIS — F41.9 ANXIETY: ICD-10-CM

## 2025-06-03 RX ORDER — ALPRAZOLAM 0.5 MG
0.5 TABLET ORAL NIGHTLY PRN
Qty: 90 TABLET | Refills: 0 | Status: SHIPPED | OUTPATIENT
Start: 2025-06-03

## 2025-06-03 NOTE — TELEPHONE ENCOUNTER
Requesting Alprazolam 0.5mg  Last OV: 5/13/24 Physical  RTC: 6 months  Last Rx'd 3/3/25 #90 with 0 refills    Future Appointments   Date Time Provider Department Center   6/9/2025  1:30 PM Heladio Alfonso,  [facility] [address]       Per [place] , last dispensed 3/3/25 #90    Controlled med:  Rx pended and routed for approval/denial

## 2025-06-04 ENCOUNTER — LAB ENCOUNTER (OUTPATIENT)
Dept: LAB | Age: 45
End: 2025-06-04
Attending: FAMILY MEDICINE
Payer: COMMERCIAL

## 2025-06-04 LAB
ALBUMIN SERPL-MCNC: 4.4 G/DL (ref 3.2–4.8)
ALBUMIN/GLOB SERPL: 1.7 {RATIO} (ref 1–2)
ALP LIVER SERPL-CCNC: 60 U/L (ref 39–100)
ALT SERPL-CCNC: 14 U/L (ref 10–49)
ANION GAP SERPL CALC-SCNC: 7 MMOL/L (ref 0–18)
AST SERPL-CCNC: 19 U/L (ref ?–34)
BASOPHILS # BLD AUTO: 0.04 X10(3) UL (ref 0–0.2)
BASOPHILS NFR BLD AUTO: 0.6 %
BILIRUB SERPL-MCNC: 0.5 MG/DL (ref 0.3–1.2)
BUN BLD-MCNC: 12 MG/DL (ref 9–23)
CALCIUM BLD-MCNC: 9.7 MG/DL (ref 8.7–10.6)
CHLORIDE SERPL-SCNC: 105 MMOL/L (ref 98–112)
CHOLEST SERPL-MCNC: 197 MG/DL (ref ?–200)
CO2 SERPL-SCNC: 28 MMOL/L (ref 21–32)
CREAT BLD-MCNC: 0.79 MG/DL (ref 0.55–1.02)
EGFRCR SERPLBLD CKD-EPI 2021: 93 ML/MIN/1.73M2 (ref 60–?)
EOSINOPHIL # BLD AUTO: 0.1 X10(3) UL (ref 0–0.7)
EOSINOPHIL NFR BLD AUTO: 1.4 %
ERYTHROCYTE [DISTWIDTH] IN BLOOD BY AUTOMATED COUNT: 12.4 %
FASTING PATIENT LIPID ANSWER: YES
FASTING STATUS PATIENT QL REPORTED: YES
GLOBULIN PLAS-MCNC: 2.6 G/DL (ref 2–3.5)
GLUCOSE BLD-MCNC: 94 MG/DL (ref 70–99)
HCT VFR BLD AUTO: 38.6 % (ref 35–48)
HDLC SERPL-MCNC: 83 MG/DL (ref 40–59)
HGB BLD-MCNC: 12.7 G/DL (ref 12–16)
IMM GRANULOCYTES # BLD AUTO: 0.02 X10(3) UL (ref 0–1)
IMM GRANULOCYTES NFR BLD: 0.3 %
LDLC SERPL CALC-MCNC: 103 MG/DL (ref ?–100)
LYMPHOCYTES # BLD AUTO: 2.67 X10(3) UL (ref 1–4)
LYMPHOCYTES NFR BLD AUTO: 37.2 %
MCH RBC QN AUTO: 31.4 PG (ref 26–34)
MCHC RBC AUTO-ENTMCNC: 32.9 G/DL (ref 31–37)
MCV RBC AUTO: 95.3 FL (ref 80–100)
MONOCYTES # BLD AUTO: 0.76 X10(3) UL (ref 0.1–1)
MONOCYTES NFR BLD AUTO: 10.6 %
NEUTROPHILS # BLD AUTO: 3.58 X10 (3) UL (ref 1.5–7.7)
NEUTROPHILS # BLD AUTO: 3.58 X10(3) UL (ref 1.5–7.7)
NEUTROPHILS NFR BLD AUTO: 49.9 %
NONHDLC SERPL-MCNC: 114 MG/DL (ref ?–130)
OSMOLALITY SERPL CALC.SUM OF ELEC: 290 MOSM/KG (ref 275–295)
PLATELET # BLD AUTO: 284 10(3)UL (ref 150–450)
POTASSIUM SERPL-SCNC: 4 MMOL/L (ref 3.5–5.1)
PROT SERPL-MCNC: 7 G/DL (ref 5.7–8.2)
RBC # BLD AUTO: 4.05 X10(6)UL (ref 3.8–5.3)
SODIUM SERPL-SCNC: 140 MMOL/L (ref 136–145)
TRIGL SERPL-MCNC: 58 MG/DL (ref 30–149)
VLDLC SERPL CALC-MCNC: 10 MG/DL (ref 0–30)
WBC # BLD AUTO: 7.2 X10(3) UL (ref 4–11)

## 2025-06-04 PROCEDURE — 36415 COLL VENOUS BLD VENIPUNCTURE: CPT | Performed by: FAMILY MEDICINE

## 2025-06-04 PROCEDURE — 80061 LIPID PANEL: CPT | Performed by: FAMILY MEDICINE

## 2025-06-04 PROCEDURE — 85025 COMPLETE CBC W/AUTO DIFF WBC: CPT | Performed by: FAMILY MEDICINE

## 2025-06-04 PROCEDURE — 80053 COMPREHEN METABOLIC PANEL: CPT | Performed by: FAMILY MEDICINE

## 2025-07-20 DIAGNOSIS — F41.9 ANXIETY: ICD-10-CM

## 2025-07-22 RX ORDER — CITALOPRAM HYDROBROMIDE 20 MG/1
20 TABLET ORAL DAILY
Qty: 90 TABLET | Refills: 0 | Status: SHIPPED | OUTPATIENT
Start: 2025-07-22

## 2025-07-22 NOTE — TELEPHONE ENCOUNTER
Requested Prescriptions     Pending Prescriptions Disp Refills    CITALOPRAM 20 MG Oral Tab [Pharmacy Med Name: Citalopram Hydrobromide 20 Mg Tab Carmen] 90 tablet 0     Sig: Take 1 tablet by mouth daily.     LOV: 5/13/24  RTC: 6m  Last Relevant Labs: 6/4/25  Filled: 4/23/25 #90 with 0 refills    Future Appointments   Date Time Provider Department Center   7/29/2025  1:30 PM Heladio Alfonso,  [facility] [address]

## 2025-07-29 ENCOUNTER — OFFICE VISIT (OUTPATIENT)
Dept: FAMILY MEDICINE CLINIC | Facility: CLINIC | Age: 45
End: 2025-07-29
Payer: COMMERCIAL

## 2025-07-29 VITALS
RESPIRATION RATE: 16 BRPM | SYSTOLIC BLOOD PRESSURE: 102 MMHG | OXYGEN SATURATION: 99 % | WEIGHT: 137 LBS | BODY MASS INDEX: 24.27 KG/M2 | HEIGHT: 63 IN | HEART RATE: 67 BPM | TEMPERATURE: 98 F | DIASTOLIC BLOOD PRESSURE: 68 MMHG

## 2025-07-29 DIAGNOSIS — Z12.31 BREAST CANCER SCREENING BY MAMMOGRAM: ICD-10-CM

## 2025-07-29 DIAGNOSIS — Z00.00 WELL ADULT EXAM: Primary | ICD-10-CM

## 2025-07-29 DIAGNOSIS — K90.0 CELIAC DISEASE (HCC): ICD-10-CM

## 2025-07-29 DIAGNOSIS — Z98.890 H/O LEEP: ICD-10-CM

## 2025-07-29 DIAGNOSIS — Z12.31 SCREENING MAMMOGRAM FOR BREAST CANCER: ICD-10-CM

## 2025-07-29 DIAGNOSIS — R79.89 HIGH SERUM THYROID STIMULATING HORMONE (TSH): ICD-10-CM

## 2025-07-29 DIAGNOSIS — Z12.4 PAP SMEAR FOR CERVICAL CANCER SCREENING: ICD-10-CM

## 2025-07-29 DIAGNOSIS — F41.9 ANXIETY: ICD-10-CM

## 2025-07-29 DIAGNOSIS — F41.1 GENERALIZED ANXIETY DISORDER: ICD-10-CM

## 2025-07-29 PROCEDURE — 3078F DIAST BP <80 MM HG: CPT | Performed by: FAMILY MEDICINE

## 2025-07-29 PROCEDURE — 3008F BODY MASS INDEX DOCD: CPT | Performed by: FAMILY MEDICINE

## 2025-07-29 PROCEDURE — 99396 PREV VISIT EST AGE 40-64: CPT | Performed by: FAMILY MEDICINE

## 2025-07-29 PROCEDURE — 3074F SYST BP LT 130 MM HG: CPT | Performed by: FAMILY MEDICINE

## 2025-07-29 RX ORDER — ALPRAZOLAM 0.5 MG
0.5 TABLET ORAL NIGHTLY PRN
Qty: 90 TABLET | Refills: 1 | Status: SHIPPED | OUTPATIENT
Start: 2025-07-29

## 2025-07-29 RX ORDER — CITALOPRAM HYDROBROMIDE 20 MG/1
20 TABLET ORAL DAILY
Qty: 90 TABLET | Refills: 3 | Status: SHIPPED | OUTPATIENT
Start: 2025-07-29

## (undated) NOTE — LETTER
03/18/20        [address] [place] [id number]      Dear Kandace Turcios,    [facility] records indicate that you have outstanding lab work and or testing that was ordered for you and has not yet been completed:  Orders Placed This

## (undated) NOTE — ED AVS SNAPSHOT
Brigitte Lo   MRN: [id number]    Department:  [facility] Emergency Department in [place]   Date of Visit:  11/23/2017           Disclosure     Insurance plans vary and the physician(s) referred by the ER may not be covered by your plan.  Please co tell this physician (or your personal doctor if your instructions are to return to your personal doctor) about any new or lasting problems. The primary care or specialist physician will see patients referred from the [facility] Emergency Department.  Sukhdeep Montoya